# Patient Record
Sex: FEMALE | HISPANIC OR LATINO | URBAN - METROPOLITAN AREA
[De-identification: names, ages, dates, MRNs, and addresses within clinical notes are randomized per-mention and may not be internally consistent; named-entity substitution may affect disease eponyms.]

---

## 2020-08-11 ENCOUNTER — OFFICE VISIT (OUTPATIENT)
Dept: URBAN - METROPOLITAN AREA CLINIC 98 | Facility: CLINIC | Age: 55
End: 2020-08-11
Payer: COMMERCIAL

## 2020-08-11 DIAGNOSIS — R10.32 LLQ ABDOMINAL PAIN: ICD-10-CM

## 2020-08-11 DIAGNOSIS — R10.13 EPIGASTRIC PAIN: ICD-10-CM

## 2020-08-11 PROCEDURE — 99243 OFF/OP CNSLTJ NEW/EST LOW 30: CPT | Performed by: INTERNAL MEDICINE

## 2020-08-11 PROCEDURE — 99203 OFFICE O/P NEW LOW 30 MIN: CPT | Performed by: INTERNAL MEDICINE

## 2020-08-11 RX ORDER — PANTOPRAZOLE SODIUM 40 MG/1
1 TABLET TABLET, DELAYED RELEASE ORAL ONCE A DAY
Qty: 30 | Refills: 2 | OUTPATIENT
Start: 2020-08-11

## 2020-08-11 RX ORDER — PANTOPRAZOLE SODIUM 40 MG/1
1 TABLET TABLET, DELAYED RELEASE ORAL ONCE A DAY
Qty: 30 | Refills: 3 | OUTPATIENT
Start: 2020-08-11

## 2020-08-11 RX ORDER — SODIUM, POTASSIUM,MAG SULFATES 17.5-3.13G
354ML SOLUTION, RECONSTITUTED, ORAL ORAL
Qty: 354 MILLILITER | Refills: 0 | OUTPATIENT
Start: 2020-08-11 | End: 2020-08-12

## 2020-08-11 NOTE — HPI-TODAY'S VISIT:
56 yo pt w/ Hx prolongued viral URI 3/20, complicated by bronchitis, Rx'd w/ steroids, and abx's w/ gradual resolution.  Today, and for the paste couple of mos, she has been c/o waterbrash, p-prandial epigastric discomfort at times, w/ some lower abdominal distention and bloating w/ p-prandial bm's w/o bleeding. She was Rx'd for Hp-gastritis, and above sxs have not resolved. She admits to starining at stools and no melenic stools. Denies fever or chills and no cardiorespiratory sxs.

## 2020-08-24 ENCOUNTER — OFFICE VISIT (OUTPATIENT)
Dept: URBAN - METROPOLITAN AREA SURGERY CENTER 18 | Facility: SURGERY CENTER | Age: 55
End: 2020-08-24
Payer: COMMERCIAL

## 2020-08-24 DIAGNOSIS — K63.5 BENIGN COLON POLYP: ICD-10-CM

## 2020-08-24 DIAGNOSIS — R10.84 ABDOMINAL CRAMPING, GENERALIZED: ICD-10-CM

## 2020-08-24 PROCEDURE — 45380 COLONOSCOPY AND BIOPSY: CPT | Performed by: INTERNAL MEDICINE

## 2020-08-24 PROCEDURE — G9937 DIG OR SURV COLSCO: HCPCS | Performed by: INTERNAL MEDICINE

## 2020-08-24 PROCEDURE — G8907 PT DOC NO EVENTS ON DISCHARG: HCPCS | Performed by: INTERNAL MEDICINE

## 2020-08-24 RX ORDER — PANTOPRAZOLE SODIUM 40 MG/1
1 TABLET TABLET, DELAYED RELEASE ORAL ONCE A DAY
Qty: 30 | Refills: 2 | Status: ACTIVE | COMMUNITY
Start: 2020-08-11

## 2020-08-24 RX ORDER — PANTOPRAZOLE SODIUM 40 MG/1
1 TABLET TABLET, DELAYED RELEASE ORAL ONCE A DAY
Qty: 30 | Refills: 3 | Status: ACTIVE | COMMUNITY
Start: 2020-08-11

## 2020-08-27 ENCOUNTER — TELEPHONE ENCOUNTER (OUTPATIENT)
Dept: URBAN - METROPOLITAN AREA CLINIC 98 | Facility: CLINIC | Age: 55
End: 2020-08-27

## 2020-08-28 ENCOUNTER — LAB OUTSIDE AN ENCOUNTER (OUTPATIENT)
Dept: URBAN - METROPOLITAN AREA CLINIC 98 | Facility: CLINIC | Age: 55
End: 2020-08-28

## 2020-09-08 ENCOUNTER — LAB OUTSIDE AN ENCOUNTER (OUTPATIENT)
Dept: URBAN - METROPOLITAN AREA CLINIC 98 | Facility: CLINIC | Age: 55
End: 2020-09-08

## 2020-09-10 ENCOUNTER — OFFICE VISIT (OUTPATIENT)
Dept: URBAN - METROPOLITAN AREA SURGERY CENTER 18 | Facility: SURGERY CENTER | Age: 55
End: 2020-09-10

## 2020-09-10 LAB
C-REACTIVE PROTEIN, QUANT: 7
IMMUNOGLOBULIN A, QN, SERUM: 181
IRON BIND.CAP.(TIBC): 287
IRON SATURATION: 25
IRON: 71
SEDIMENTATION RATE-WESTERGREN: 43
T-TRANSGLUTAMINASE (TTG) IGA: <2
UIBC: 216
VITAMIN B12: 634

## 2020-09-16 ENCOUNTER — OFFICE VISIT (OUTPATIENT)
Dept: URBAN - METROPOLITAN AREA CLINIC 98 | Facility: CLINIC | Age: 55
End: 2020-09-16
Payer: COMMERCIAL

## 2020-09-16 DIAGNOSIS — K76.0 NAFLD (NONALCOHOLIC FATTY LIVER DISEASE): ICD-10-CM

## 2020-09-16 DIAGNOSIS — R10.9 ABDOMINAL PAIN OF MULTIPLE SITES: ICD-10-CM

## 2020-09-16 DIAGNOSIS — K76.89 LIVER CYSTS: ICD-10-CM

## 2020-09-16 PROCEDURE — 1036F TOBACCO NON-USER: CPT | Performed by: INTERNAL MEDICINE

## 2020-09-16 PROCEDURE — G8420 CALC BMI NORM PARAMETERS: HCPCS | Performed by: INTERNAL MEDICINE

## 2020-09-16 PROCEDURE — 3017F COLORECTAL CA SCREEN DOC REV: CPT | Performed by: INTERNAL MEDICINE

## 2020-09-16 PROCEDURE — G9903 PT SCRN TBCO ID AS NON USER: HCPCS | Performed by: INTERNAL MEDICINE

## 2020-09-16 PROCEDURE — G8427 DOCREV CUR MEDS BY ELIG CLIN: HCPCS | Performed by: INTERNAL MEDICINE

## 2020-09-16 PROCEDURE — 99214 OFFICE O/P EST MOD 30 MIN: CPT | Performed by: INTERNAL MEDICINE

## 2020-09-16 RX ORDER — PANTOPRAZOLE SODIUM 40 MG/1
1 TABLET TABLET, DELAYED RELEASE ORAL ONCE A DAY
Qty: 30 | Refills: 2 | Status: ACTIVE | COMMUNITY
Start: 2020-08-11

## 2020-09-16 RX ORDER — PANTOPRAZOLE SODIUM 40 MG/1
1 TABLET TABLET, DELAYED RELEASE ORAL ONCE A DAY
Qty: 30 | Refills: 3 | Status: ACTIVE | COMMUNITY
Start: 2020-08-11

## 2020-09-16 NOTE — HPI-TODAY'S VISIT:
56 yo pt for abdominal pain f/u. Doing muc better; less abdominal pain, no INDIGO sxs, weight stable, good appetite on a healthy diet. No LGI sxs. Colonoscopy: normal x for lymphoid aggregate. Labs: normal B 12, CRP, celiac serology  and ESR 43. RUQ-US: fatty liver liver cysts and normal GB. She has no otehr sxs after extensive ROS.

## 2020-09-22 ENCOUNTER — OFFICE VISIT (OUTPATIENT)
Dept: URBAN - METROPOLITAN AREA CLINIC 97 | Facility: CLINIC | Age: 55
End: 2020-09-22

## 2020-10-08 ENCOUNTER — OFFICE VISIT (OUTPATIENT)
Dept: URBAN - METROPOLITAN AREA CLINIC 98 | Facility: CLINIC | Age: 55
End: 2020-10-08

## 2021-03-17 ENCOUNTER — OFFICE VISIT (OUTPATIENT)
Dept: URBAN - METROPOLITAN AREA CLINIC 98 | Facility: CLINIC | Age: 56
End: 2021-03-17
Payer: COMMERCIAL

## 2021-03-17 DIAGNOSIS — R10.13 EPIGASTRIC PAIN: ICD-10-CM

## 2021-03-17 DIAGNOSIS — K76.0 NAFLD (NONALCOHOLIC FATTY LIVER DISEASE): ICD-10-CM

## 2021-03-17 PROCEDURE — 99214 OFFICE O/P EST MOD 30 MIN: CPT | Performed by: INTERNAL MEDICINE

## 2021-03-17 RX ORDER — PANTOPRAZOLE SODIUM 40 MG/1
1 TABLET TABLET, DELAYED RELEASE ORAL ONCE A DAY
Qty: 30 | Refills: 3 | Status: ACTIVE | COMMUNITY
Start: 2020-08-11

## 2021-03-17 RX ORDER — PANTOPRAZOLE SODIUM 40 MG/1
1 TABLET TABLET, DELAYED RELEASE ORAL ONCE A DAY
Qty: 30 | Refills: 2 | Status: ACTIVE | COMMUNITY
Start: 2020-08-11

## 2021-03-17 NOTE — HPI-TODAY'S VISIT:
54 yo pt w/ known and controlled INDIGO sxs w/ diet, NAFLD on diet , now w/ some LBP and R-hip pain w radiation to the R-LE. No alarm sxs. Has lost ~ 12 to 15 lbs w/ diet changes only. Past few days, p-prandial epigastric pain and fullness w/o N/V.  NO anorexia or fatigue. Good appetite and normal bowel pattern. No urologic sxs. Denies cardiorespiratory sxs.  Normal colonoscopy last year.

## 2021-03-18 ENCOUNTER — TELEPHONE ENCOUNTER (OUTPATIENT)
Dept: URBAN - METROPOLITAN AREA CLINIC 98 | Facility: CLINIC | Age: 56
End: 2021-03-18

## 2021-03-18 ENCOUNTER — OFFICE VISIT (OUTPATIENT)
Dept: URBAN - METROPOLITAN AREA SURGERY CENTER 18 | Facility: SURGERY CENTER | Age: 56
End: 2021-03-18
Payer: COMMERCIAL

## 2021-03-18 DIAGNOSIS — K22.8 COLUMNAR-LINED ESOPHAGUS: ICD-10-CM

## 2021-03-18 DIAGNOSIS — K29.60 ADENOPAPILLOMATOSIS GASTRICA: ICD-10-CM

## 2021-03-18 LAB
A/G RATIO: 1.4
ALBUMIN: 4.2
ALKALINE PHOSPHATASE: 94
ALT (SGPT): 18
AST (SGOT): 10
BILIRUBIN, TOTAL: 0.4
BUN/CREATININE RATIO: 20
BUN: 12
CALCIUM: 9.9
CARBON DIOXIDE, TOTAL: 24
CHLORIDE: 106
CREATININE: 0.59
EGFR IF AFRICN AM: 118
EGFR IF NONAFRICN AM: 102
GLOBULIN, TOTAL: 2.9
GLUCOSE: 99
HEMATOCRIT: 42.1
HEMOGLOBIN: 13.8
LIPASE: 26
MCH: 30.1
MCHC: 32.8
MCV: 92
NRBC: (no result)
PLATELETS: 170
POTASSIUM: 4.1
PROTEIN, TOTAL: 7.1
RBC: 4.58
RDW: 13
SEDIMENTATION RATE-WESTERGREN: 11
SODIUM: 143
T4,FREE(DIRECT): 1.43
TSH: 1.76
WBC: 5.9

## 2021-03-18 PROCEDURE — 43239 EGD BIOPSY SINGLE/MULTIPLE: CPT | Performed by: INTERNAL MEDICINE

## 2021-03-18 PROCEDURE — G8907 PT DOC NO EVENTS ON DISCHARG: HCPCS | Performed by: INTERNAL MEDICINE

## 2021-03-18 RX ORDER — PANTOPRAZOLE SODIUM 40 MG/1
1 TABLET TABLET, DELAYED RELEASE ORAL ONCE A DAY
Qty: 30 | Refills: 2 | Status: ACTIVE | COMMUNITY
Start: 2020-08-11

## 2021-03-18 RX ORDER — PANTOPRAZOLE SODIUM 40 MG/1
1 TABLET TABLET, DELAYED RELEASE ORAL ONCE A DAY
Qty: 30 | Refills: 3 | Status: ACTIVE | COMMUNITY
Start: 2020-08-11

## 2021-03-18 RX ORDER — PANTOPRAZOLE SODIUM 40 MG/1
1 TABLET TABLET, DELAYED RELEASE ORAL ONCE A DAY
Qty: 30 | Refills: 3 | OUTPATIENT
Start: 2021-03-23

## 2021-03-19 ENCOUNTER — LAB OUTSIDE AN ENCOUNTER (OUTPATIENT)
Dept: URBAN - METROPOLITAN AREA CLINIC 98 | Facility: CLINIC | Age: 56
End: 2021-03-19

## 2021-03-25 ENCOUNTER — OFFICE VISIT (OUTPATIENT)
Dept: URBAN - METROPOLITAN AREA CLINIC 98 | Facility: CLINIC | Age: 56
End: 2021-03-25
Payer: COMMERCIAL

## 2021-03-25 DIAGNOSIS — K76.89 LIVER CYST: ICD-10-CM

## 2021-03-25 DIAGNOSIS — R14.0 BLOATING SYMPTOM: ICD-10-CM

## 2021-03-25 DIAGNOSIS — K21.9 GERD WITHOUT ESOPHAGITIS: ICD-10-CM

## 2021-03-25 DIAGNOSIS — Z86.010 PERSONAL HISTORY OF COLONIC POLYPS: ICD-10-CM

## 2021-03-25 DIAGNOSIS — K76.0 NAFLD (NONALCOHOLIC FATTY LIVER DISEASE): ICD-10-CM

## 2021-03-25 PROBLEM — 21522001 ABDOMINAL PAIN: Status: ACTIVE | Noted: 2021-03-25

## 2021-03-25 PROCEDURE — 99214 OFFICE O/P EST MOD 30 MIN: CPT | Performed by: INTERNAL MEDICINE

## 2021-03-25 RX ORDER — PANTOPRAZOLE SODIUM 40 MG/1
1 TABLET TABLET, DELAYED RELEASE ORAL ONCE A DAY
Qty: 30 | Refills: 3 | Status: ACTIVE | COMMUNITY
Start: 2020-08-11

## 2021-03-25 RX ORDER — PANTOPRAZOLE SODIUM 40 MG/1
1 TABLET TABLET, DELAYED RELEASE ORAL ONCE A DAY
Qty: 30 | Refills: 2 | Status: ACTIVE | COMMUNITY
Start: 2020-08-11

## 2021-03-25 RX ORDER — PANTOPRAZOLE SODIUM 40 MG/1
1 TABLET TABLET, DELAYED RELEASE ORAL ONCE A DAY
Qty: 30 | Refills: 3 | Status: ACTIVE | COMMUNITY
Start: 2021-03-23

## 2021-03-25 NOTE — HPI-TODAY'S VISIT:
54 yo pt w/ controlled INDIGO sxs and mayra alarm nor extraesophageal sxs and NFLD on diet. Has been feeling better. EGD: NERD and Hp-negative antral gastritis. Colonoscopy 8/20: colon polyps and colonoic spasm. RUQ-US: fatty liver w/o parenchymal lesions and a liver cyst. Labs are all nomal including LFT's. No other complaints. No COVID-19 exposure.

## 2021-03-26 ENCOUNTER — TELEPHONE ENCOUNTER (OUTPATIENT)
Dept: URBAN - METROPOLITAN AREA CLINIC 23 | Facility: CLINIC | Age: 56
End: 2021-03-26

## 2021-08-31 ENCOUNTER — OFFICE VISIT (OUTPATIENT)
Dept: URBAN - METROPOLITAN AREA CLINIC 98 | Facility: CLINIC | Age: 56
End: 2021-08-31
Payer: COMMERCIAL

## 2021-08-31 ENCOUNTER — TELEPHONE ENCOUNTER (OUTPATIENT)
Dept: URBAN - METROPOLITAN AREA CLINIC 98 | Facility: CLINIC | Age: 56
End: 2021-08-31

## 2021-08-31 ENCOUNTER — LAB OUTSIDE AN ENCOUNTER (OUTPATIENT)
Dept: URBAN - METROPOLITAN AREA CLINIC 98 | Facility: CLINIC | Age: 56
End: 2021-08-31

## 2021-08-31 DIAGNOSIS — K76.0 NAFLD (NONALCOHOLIC FATTY LIVER DISEASE): ICD-10-CM

## 2021-08-31 DIAGNOSIS — R14.0 POSTPRANDIAL ABDOMINAL BLOATING: ICD-10-CM

## 2021-08-31 DIAGNOSIS — K76.89 LIVER CYST: ICD-10-CM

## 2021-08-31 DIAGNOSIS — Z86.010 PERSONAL HISTORY OF COLONIC POLYPS: ICD-10-CM

## 2021-08-31 LAB
AG RATIO: 1
ALBUMIN LEVEL: 4.2
ALK PHOS: 91
ALT: 37
ANION GAP: 6
AST: 24
BILIRUBIN TOTAL: 0.4
BUN/CREAT RATIO: 14
BUN: 10
CALCIUM LEVEL: 10
CHLORIDE LEVEL: 108
CO2 LEVEL: 26
CREATININE LEVEL: 0.7
CRP: 0.23
GFR AFRICAN AMERICAN: >60
GFR NON AFRICAN AMERICAN: >60
GLUCOSE LEVEL: 99
OSMO (CALC): 278
PERFORMING LAB: (no result)
PERFORMING LAB: (no result)
POTASSIUM LEVEL: 3.7
PROTEIN TOTAL: 7.7
SODIUM LEVEL: 140

## 2021-08-31 PROCEDURE — 99214 OFFICE O/P EST MOD 30 MIN: CPT | Performed by: INTERNAL MEDICINE

## 2021-08-31 RX ORDER — NITAZOXANIDE 500 MG/1
1 TABLET WITH FOOD TABLET ORAL
Qty: 20 TABLET | Refills: 1 | OUTPATIENT
Start: 2021-08-31 | End: 2021-09-20

## 2021-08-31 RX ORDER — RIFAXIMIN 550 MG/1
1 TABLET TABLET ORAL THREE TIMES A DAY
Qty: 42 TABLET | Refills: 1 | OUTPATIENT
Start: 2021-08-31 | End: 2021-09-28

## 2021-08-31 RX ORDER — PANTOPRAZOLE SODIUM 40 MG/1
1 TABLET TABLET, DELAYED RELEASE ORAL ONCE A DAY
Qty: 30 | Refills: 3 | Status: ACTIVE | COMMUNITY
Start: 2021-03-23

## 2021-08-31 NOTE — HPI-TODAY'S VISIT:
57 yo pt w/ controlled INDIGO sxs and no alarm nor extraesophageal sxs and NAFLD on diet, here for f/u. Lately, has c/ o p-prandial upper abdominal distention after ingestion of all meals, w/o N/V / dysphagia / odynophagia and reports normal bm's w/o melenic stools nor bleeding. On healthy diet. No anorexia or weight loss. Has no cardiorespiratory sxs, x for palpitations. Has had a normal Pulmonary and cardiac evaluation.   No recent labs. Has lost weight w/ diet and exercise. EGD: NERD and Hp-negative antral gastritis. Colonoscopy 8/20: colon polyps and colonoic spasm. RUQ-US: fatty liver w/o parenchymal lesions and a liver cyst.  No other complaints. No COVID-19 exposure.

## 2021-09-02 LAB
CALPROTECTIN FECES: (no result)
PANC ELASTASE INTERP: (no result)
PERFORMING LAB: (no result)
PERFORMING LAB: (no result)

## 2021-09-28 ENCOUNTER — OFFICE VISIT (OUTPATIENT)
Dept: URBAN - METROPOLITAN AREA CLINIC 97 | Facility: CLINIC | Age: 56
End: 2021-09-28
Payer: COMMERCIAL

## 2021-09-28 DIAGNOSIS — K76.89 HEPATIC CYST: ICD-10-CM

## 2021-09-28 PROCEDURE — 76705 ECHO EXAM OF ABDOMEN: CPT | Performed by: INTERNAL MEDICINE

## 2021-10-02 ENCOUNTER — TELEPHONE ENCOUNTER (OUTPATIENT)
Dept: URBAN - METROPOLITAN AREA CLINIC 92 | Facility: CLINIC | Age: 56
End: 2021-10-02

## 2021-10-12 ENCOUNTER — LAB OUTSIDE AN ENCOUNTER (OUTPATIENT)
Dept: URBAN - METROPOLITAN AREA CLINIC 98 | Facility: CLINIC | Age: 56
End: 2021-10-12

## 2021-10-27 ENCOUNTER — OFFICE VISIT (OUTPATIENT)
Dept: URBAN - METROPOLITAN AREA CLINIC 98 | Facility: CLINIC | Age: 56
End: 2021-10-27
Payer: COMMERCIAL

## 2021-10-27 DIAGNOSIS — R35.1 NOCTURIA: ICD-10-CM

## 2021-10-27 DIAGNOSIS — K21.9 GERD WITHOUT ESOPHAGITIS: ICD-10-CM

## 2021-10-27 DIAGNOSIS — R14.0 ABDOMINAL BLOATING: ICD-10-CM

## 2021-10-27 PROCEDURE — 99213 OFFICE O/P EST LOW 20 MIN: CPT | Performed by: INTERNAL MEDICINE

## 2021-10-27 RX ORDER — PANTOPRAZOLE SODIUM 40 MG/1
1 TABLET TABLET, DELAYED RELEASE ORAL ONCE A DAY
Qty: 30 | Refills: 3 | Status: ACTIVE | COMMUNITY
Start: 2021-03-23

## 2021-10-27 NOTE — HPI-TODAY'S VISIT:
55 yo pt w/ controlled INDIGO sxs and no alarm nor extraesophageal sxs and NAFLD on diet, here for f/u. Lately, has still intermittent, but less  p-prandial upper abdominal distention after ingestion of all meals, w/o N/V / dysphagia / odynophagia and reports normal bm's w/o melenic stools nor bleeding. On healthy diet. No anorexia or weight loss. Has no cardiorespiratory sxs, x for palpitations.  Recent MRI : normal x for hepatic cyst. Has had a normal Pulmonary and cardiac evaluation.   No recent labs. Has lost weight w/ diet and exercise. EGD: NERD and Hp-negative antral gastritis. Colonoscopy 8/20: colon polyps and colonoic spasm. RUQ-US: fatty liver w/o parenchymal lesions and a liver cyst.  No other complaints. No COVID-19 exposure. She has been experiencing urinary frequency and nocturia. No hematuria , dysuria nor fever or chills.

## 2022-09-19 ENCOUNTER — TELEPHONE ENCOUNTER (OUTPATIENT)
Dept: URBAN - METROPOLITAN AREA CLINIC 98 | Facility: CLINIC | Age: 57
End: 2022-09-19

## 2022-10-26 ENCOUNTER — OFFICE VISIT (OUTPATIENT)
Dept: URBAN - METROPOLITAN AREA CLINIC 98 | Facility: CLINIC | Age: 57
End: 2022-10-26
Payer: COMMERCIAL

## 2022-10-26 ENCOUNTER — TELEPHONE ENCOUNTER (OUTPATIENT)
Dept: URBAN - METROPOLITAN AREA CLINIC 98 | Facility: CLINIC | Age: 57
End: 2022-10-26

## 2022-10-26 ENCOUNTER — LAB OUTSIDE AN ENCOUNTER (OUTPATIENT)
Dept: URBAN - METROPOLITAN AREA CLINIC 98 | Facility: CLINIC | Age: 57
End: 2022-10-26

## 2022-10-26 VITALS
DIASTOLIC BLOOD PRESSURE: 60 MMHG | WEIGHT: 144.4 LBS | BODY MASS INDEX: 30.31 KG/M2 | SYSTOLIC BLOOD PRESSURE: 114 MMHG | TEMPERATURE: 97.1 F | HEART RATE: 69 BPM | HEIGHT: 58 IN

## 2022-10-26 DIAGNOSIS — R10.30 LOWER ABDOMINAL PAIN: ICD-10-CM

## 2022-10-26 DIAGNOSIS — K76.0 NAFLD (NONALCOHOLIC FATTY LIVER DISEASE): ICD-10-CM

## 2022-10-26 DIAGNOSIS — K76.89 LIVER CYST: ICD-10-CM

## 2022-10-26 DIAGNOSIS — K21.9 GERD WITHOUT ESOPHAGITIS: ICD-10-CM

## 2022-10-26 DIAGNOSIS — Z86.010 PERSONAL HISTORY OF COLONIC POLYPS: ICD-10-CM

## 2022-10-26 DIAGNOSIS — E66.9 OBESITY (BMI 30.0-34.9): ICD-10-CM

## 2022-10-26 LAB
ABSOLUTE BASOPHIL COUNT: 0.03
ABSOLUTE EOSINOPHIL COUNT: 0.07
ABSOLUTE IMMATURE GRANULOCYTE  COUNT: 0.01
ABSOLUTE LYMPHOCYTE COUNT: 2.53
ABSOLUTE MONOCYTE COUNT: 0.27
ABSOLUTE NEUTROPHIL COUNT (ANC): 2.58
ABSOLUTE NRBC  COUNT: 0
AG RATIO: 1
ALBUMIN LEVEL: 4.3
ALK PHOS: 94
ALT: 28
ANION GAP: 7
AST: 22
BASOPHIL AUTO: 0
BILIRUBIN TOTAL: 0.5
BUN/CREAT RATIO: 21
BUN: 14
CALCIUM LEVEL: 9.7
CHLORIDE LEVEL: 107
CO2 LEVEL: 24
CREATININE LEVEL: 0.7
EOS AUTO: 1
GFR 2021: >60
GLUCOSE LEVEL: 74
HCT: 42.9
HGB: 13.6
IMMATURE GRANULOCYTES AUTO: 0.2
LIPASE LEVEL: 27
LYMPH AUTO: 46
MCH: 29.8
MCHC: 31.7
MCV: 93.9
MONO AUTO: 5
MPV: 12
NEUTRO AUTO: 47
NRBC AUTO: 0
OSMO (CALC): 275
PERFORMING LAB: (no result)
PLATELETS: 172
POTASSIUM LEVEL: 3.9
PROTEIN TOTAL: 7.6
RBC: 4.57
RDW: 12.1
SODIUM LEVEL: 138
WBC: 5.5

## 2022-10-26 PROCEDURE — 99214 OFFICE O/P EST MOD 30 MIN: CPT | Performed by: INTERNAL MEDICINE

## 2022-10-26 RX ORDER — PANTOPRAZOLE SODIUM 40 MG/1
1 TABLET TABLET, DELAYED RELEASE ORAL ONCE A DAY
Qty: 30 | Refills: 3 | Status: ACTIVE | COMMUNITY
Start: 2021-03-23

## 2022-10-26 NOTE — PHYSICAL EXAM GASTROINTESTINAL
Abdomen ,  lower abdomen distended, firm to palpation and  diffusely tender in lower abdomen w guarding , no masses palpable , normal bowel sounds , Liver and Spleen , no hepatomegaly present , no hepatosplenomegaly , liver nontender , spleen not palpable

## 2022-10-26 NOTE — HPI-TODAY'S VISIT:
56 yo pt w/ controlled INDIGO sxs and no alarm nor extraesophageal sxs,  NAFLD wo fibrosis  on diet, here for f/u.  She's s/p GYN surgery for  vaginal prolapse - cystocele w lysis of adhesions on 2/22 wo complications. Lately, has noted  lower abdominal distention and firmness, w pressure and mild abdominal pain.  Normal bm's and no urologic sxs. Has been sen by Surgery: normal evaluation, no imaging. She denies fever, chills, night sweats and no melenic stools or hematochezia.  On healthy diet. No anorexia or weight loss. Has no cardiorespiratory sxs, x for occasional palpitations.  Preop  MRI : normal x for hepatic cyst. Has had a normal Pulmonary and cardiac evaluation.   No recent labs. Has lost weight since her surgery. EGD: NERD and Hp-negative antral gastritis. Colonoscopy 8/20: colon polyps and colonic spasm. RUQ-US: fatty liver w/o parenchymal lesions and a liver cyst.  No other complaints. No COVID-19 exposure. She has been experiencing urinary frequency and nocturia. No hematuria , dysuria nor fever or chills.

## 2022-10-31 LAB
PERFORMING LAB: (no result)
SOURCE: (no result)

## 2022-11-16 ENCOUNTER — TELEPHONE ENCOUNTER (OUTPATIENT)
Dept: URBAN - METROPOLITAN AREA CLINIC 98 | Facility: CLINIC | Age: 57
End: 2022-11-16

## 2022-11-30 ENCOUNTER — WEB ENCOUNTER (OUTPATIENT)
Dept: URBAN - METROPOLITAN AREA CLINIC 98 | Facility: CLINIC | Age: 57
End: 2022-11-30

## 2022-11-30 ENCOUNTER — OFFICE VISIT (OUTPATIENT)
Dept: URBAN - METROPOLITAN AREA CLINIC 98 | Facility: CLINIC | Age: 57
End: 2022-11-30
Payer: COMMERCIAL

## 2022-11-30 VITALS
HEIGHT: 58 IN | BODY MASS INDEX: 29.56 KG/M2 | TEMPERATURE: 97.4 F | HEART RATE: 66 BPM | WEIGHT: 140.8 LBS | DIASTOLIC BLOOD PRESSURE: 74 MMHG | SYSTOLIC BLOOD PRESSURE: 125 MMHG

## 2022-11-30 DIAGNOSIS — R10.30 LOWER ABDOMINAL PAIN: ICD-10-CM

## 2022-11-30 DIAGNOSIS — K76.0 NAFLD (NONALCOHOLIC FATTY LIVER DISEASE): ICD-10-CM

## 2022-11-30 DIAGNOSIS — E66.9 OBESITY (BMI 30.0-34.9): ICD-10-CM

## 2022-11-30 DIAGNOSIS — K76.89 LIVER CYST: ICD-10-CM

## 2022-11-30 DIAGNOSIS — K21.9 GERD WITHOUT ESOPHAGITIS: ICD-10-CM

## 2022-11-30 PROCEDURE — 99214 OFFICE O/P EST MOD 30 MIN: CPT | Performed by: INTERNAL MEDICINE

## 2022-11-30 RX ORDER — PANTOPRAZOLE SODIUM 40 MG/1
1 TABLET TABLET, DELAYED RELEASE ORAL ONCE A DAY
Qty: 30 | Refills: 3 | Status: ACTIVE | COMMUNITY
Start: 2021-03-23

## 2022-11-30 NOTE — HPI-TODAY'S VISIT:
56 yo pt w/ controlled INDIGO sxs and no alarm nor extraesophageal sxs,  She reports intermittent lower abdominal discomfort, lasting few hours, ? triggers, unrelated to eating ot type of food she eats, wo change in bowel consistency nor frequency, and  wo bleeding nor melenic stools or urologic sxs.  Weight stable, good appetite. Recent A + P CT: liver cysts, otherwise normal. Known NAFLD wo fibrosis  on MAYNARD diet. She's s/p GYN surgery for  vaginal prolapse - cystocele w lysis of adhesions on 2/22 wo complications.  Has been sen by Surgery: normal evaluation, no imaging. She denies fever, chills, night sweats and no melenic stools or hematochezia.  Has no cardiorespiratory sxs, x for occasional palpitations.  Preop  MRI : normal x for hepatic cyst. Has had a normal Pulmonary and cardiac evaluation.   No recent labs. Has lost weight since her surgery. EGD: NERD and Hp-negative antral gastritis. Colonoscopy 8/20: colon polyps and colonic spasm. RUQ-US: fatty liver w/o parenchymal lesions and a liver cyst.  No other complaints. No COVID-19 exposure. No other complaints.

## 2022-12-02 PROBLEM — 85057007: Status: ACTIVE | Noted: 2021-03-30

## 2022-12-02 PROBLEM — 443371000124107: Status: ACTIVE | Noted: 2022-10-29

## 2022-12-02 PROBLEM — 197315008: Status: ACTIVE | Noted: 2021-03-17

## 2022-12-02 PROBLEM — 428283002: Status: ACTIVE | Noted: 2021-03-30

## 2022-12-02 PROBLEM — 266435005: Status: ACTIVE | Noted: 2021-03-30

## 2023-07-26 ENCOUNTER — OFFICE VISIT (OUTPATIENT)
Dept: URBAN - METROPOLITAN AREA CLINIC 98 | Facility: CLINIC | Age: 58
End: 2023-07-26
Payer: COMMERCIAL

## 2023-07-26 ENCOUNTER — TELEPHONE ENCOUNTER (OUTPATIENT)
Dept: URBAN - METROPOLITAN AREA CLINIC 98 | Facility: CLINIC | Age: 58
End: 2023-07-26

## 2023-07-26 VITALS
HEIGHT: 58 IN | WEIGHT: 148.8 LBS | TEMPERATURE: 97 F | SYSTOLIC BLOOD PRESSURE: 121 MMHG | DIASTOLIC BLOOD PRESSURE: 73 MMHG | BODY MASS INDEX: 31.23 KG/M2 | HEART RATE: 71 BPM

## 2023-07-26 DIAGNOSIS — K76.0 NAFLD (NONALCOHOLIC FATTY LIVER DISEASE): ICD-10-CM

## 2023-07-26 DIAGNOSIS — K21.9 GERD WITHOUT ESOPHAGITIS: ICD-10-CM

## 2023-07-26 DIAGNOSIS — E66.9 OBESITY (BMI 30.0-34.9): ICD-10-CM

## 2023-07-26 DIAGNOSIS — K76.89 LIVER CYST: ICD-10-CM

## 2023-07-26 PROCEDURE — 99214 OFFICE O/P EST MOD 30 MIN: CPT | Performed by: INTERNAL MEDICINE

## 2023-07-26 RX ORDER — PANTOPRAZOLE SODIUM 40 MG/1
1 TABLET TABLET, DELAYED RELEASE ORAL ONCE A DAY
Qty: 30 | Refills: 3 | Status: ON HOLD | COMMUNITY
Start: 2021-03-23

## 2023-07-26 NOTE — HPI-TODAY'S VISIT:
58 yo pt w/ controlled INDIGO sxs and no alarm nor extraesophageal sxs,  She reports intermittent p-prandial dyspepsia wo INDIGO sxs. No abdominal pain nor urologic sxs. Had a UTI 6/23, resolved w Cephalexin.  Weight stable, good appetite. Recent A + P CT: liver cysts, otherwise normal. Known NAFLD wo fibrosis  on MAYNARD diet. She denies fever, chills, night sweats and no melenic stools or hematochezia.  Has no cardiorespiratory sxs, x for occasional palpitations.  MRI : normal x for hepatic cyst. Has had a normal Pulmonary and cardiac evaluation.   No recent labs. Has lost weight since her GYN surgery. EGD: NERD and Hp-negative antral gastritis. Colonoscopy 8/20: colon polyps and colonic spasm. RUQ-US: fatty liver w/o parenchymal lesions and a liver cyst.  No other complaints. No COVID-19 exposure. No other complaints.

## 2023-10-26 ENCOUNTER — OFFICE VISIT (OUTPATIENT)
Dept: URBAN - METROPOLITAN AREA CLINIC 98 | Facility: CLINIC | Age: 58
End: 2023-10-26
Payer: COMMERCIAL

## 2023-10-26 ENCOUNTER — LAB OUTSIDE AN ENCOUNTER (OUTPATIENT)
Dept: URBAN - METROPOLITAN AREA CLINIC 98 | Facility: CLINIC | Age: 58
End: 2023-10-26

## 2023-10-26 ENCOUNTER — DASHBOARD ENCOUNTERS (OUTPATIENT)
Age: 58
End: 2023-10-26

## 2023-10-26 VITALS
SYSTOLIC BLOOD PRESSURE: 108 MMHG | BODY MASS INDEX: 31.4 KG/M2 | DIASTOLIC BLOOD PRESSURE: 69 MMHG | HEIGHT: 58 IN | TEMPERATURE: 97.8 F | HEART RATE: 71 BPM | WEIGHT: 149.6 LBS

## 2023-10-26 DIAGNOSIS — K76.89 LIVER CYST: ICD-10-CM

## 2023-10-26 DIAGNOSIS — K21.9 GERD WITHOUT ESOPHAGITIS: ICD-10-CM

## 2023-10-26 DIAGNOSIS — E66.9 OBESITY (BMI 30.0-34.9): ICD-10-CM

## 2023-10-26 DIAGNOSIS — K76.0 NAFLD (NONALCOHOLIC FATTY LIVER DISEASE): ICD-10-CM

## 2023-10-26 DIAGNOSIS — K64.4 SKIN TAG OF PERIANAL REGION: ICD-10-CM

## 2023-10-26 PROCEDURE — 99214 OFFICE O/P EST MOD 30 MIN: CPT | Performed by: INTERNAL MEDICINE

## 2023-10-26 RX ORDER — PANTOPRAZOLE SODIUM 40 MG/1
1 TABLET TABLET, DELAYED RELEASE ORAL ONCE A DAY
Qty: 30 | Refills: 3 | Status: ON HOLD | COMMUNITY
Start: 2021-03-23

## 2023-10-26 RX ORDER — FAMOTIDINE 40 MG/1
1 TABLET AT BEDTIME TABLET, FILM COATED ORAL ONCE A DAY
Qty: 30 | Refills: 3 | OUTPATIENT
Start: 2023-10-26

## 2023-10-26 NOTE — HPI-TODAY'S VISIT:
59 yo pt w/ controlled INDIGO sxs and no alarm nor extraesophageal sxs,  She reports intermittent p-prandial dyspepsia, w epigastric distention wo INDIGO sxs. No abdominal pain nor urologic sxs, although she just had an UTI s/p Nitrofurantoin, and resolved. Weight stable, good appetite. Recent A + P CT: liver cysts, otherwise normal. Known NAFLD wo fibrosis  on MAYNARD diet. She denies fever, chills, night sweats and no melenic stools or hematochezia. Alternating soft and formed stools.   Has no cardiorespiratory sxs, x for occasional palpitations.  MRI : normal x for hepatic cyst. Has had a normal Pulmonary and cardiac evaluation. Labs 2 weeks ago, pending. Has lost weight since her GYN surgery. EGD: NERD and Hp-negative antral gastritis. Colonoscopy 8/20: colon polyps and colonic spasm. RUQ-US: fatty liver w/o parenchymal lesions and a liver cyst.  No other complaints. No other complaints.

## 2023-10-26 NOTE — PHYSICAL EXAM GASTROINTESTINAL
Abdomen , soft, nontender, nondistended , no guarding or rigidity , no masses palpable , normal bowel sounds , Liver and Spleen, no hepatosplenomegaly , liver nontender. SPARKLE: large perianal skin tag, no stools in rectal  vault.

## 2023-10-28 LAB
AG RATIO: 1
ALBUMIN LEVEL: 4.3
ALK PHOS: 93
ALT: 20
ANION GAP: 8
AST: 15
BILIRUBIN TOTAL: 0.4
BUN/CREAT RATIO: 13
BUN: 9
CALCIUM LEVEL: 9.8
CHLORIDE LEVEL: 106
CO2 LEVEL: 26
CREATININE LEVEL: 0.7
GFR 2021: >60
GLUCOSE LEVEL: 89
OSMO (CALC): 278
PERFORMING LAB: (no result)
POTASSIUM LEVEL: 3.8
PROTEIN TOTAL: 7.7
SODIUM LEVEL: 140

## 2023-11-02 ENCOUNTER — TELEPHONE ENCOUNTER (OUTPATIENT)
Dept: URBAN - METROPOLITAN AREA CLINIC 98 | Facility: CLINIC | Age: 58
End: 2023-11-02

## 2023-11-02 RX ORDER — SODIUM, POTASSIUM,MAG SULFATES 17.5-3.13G
354ML SOLUTION, RECONSTITUTED, ORAL ORAL
Qty: 345 MILLILITER | Refills: 0 | OUTPATIENT
Start: 2023-11-04 | End: 2023-11-05

## 2023-11-03 LAB
PERFORMING LAB: (no result)
SOURCE: (no result)

## 2023-11-04 ENCOUNTER — LAB OUTSIDE AN ENCOUNTER (OUTPATIENT)
Dept: URBAN - METROPOLITAN AREA CLINIC 98 | Facility: CLINIC | Age: 58
End: 2023-11-04

## 2023-11-06 ENCOUNTER — TELEPHONE ENCOUNTER (OUTPATIENT)
Dept: URBAN - METROPOLITAN AREA CLINIC 50 | Facility: CLINIC | Age: 58
End: 2023-11-06

## 2023-12-20 ENCOUNTER — TELEPHONE ENCOUNTER (OUTPATIENT)
Dept: URBAN - METROPOLITAN AREA CLINIC 98 | Facility: CLINIC | Age: 58
End: 2023-12-20

## 2023-12-21 ENCOUNTER — CLAIMS CREATED FROM THE CLAIM WINDOW (OUTPATIENT)
Dept: URBAN - METROPOLITAN AREA SURGERY CENTER 18 | Facility: SURGERY CENTER | Age: 58
End: 2023-12-21
Payer: COMMERCIAL

## 2023-12-21 ENCOUNTER — OFFICE VISIT (OUTPATIENT)
Dept: URBAN - METROPOLITAN AREA SURGERY CENTER 18 | Facility: SURGERY CENTER | Age: 58
End: 2023-12-21

## 2023-12-21 ENCOUNTER — CLAIMS CREATED FROM THE CLAIM WINDOW (OUTPATIENT)
Dept: URBAN - METROPOLITAN AREA CLINIC 4 | Facility: CLINIC | Age: 58
End: 2023-12-21
Payer: COMMERCIAL

## 2023-12-21 DIAGNOSIS — Z12.11 COLON CANCER SCREENING (HIGH RISK): ICD-10-CM

## 2023-12-21 DIAGNOSIS — K58.9 SPASM OF COLON: ICD-10-CM

## 2023-12-21 DIAGNOSIS — Z12.11 COLON CANCER SCREENING: ICD-10-CM

## 2023-12-21 DIAGNOSIS — D12.3 ADENOMA OF TRANSVERSE COLON: ICD-10-CM

## 2023-12-21 DIAGNOSIS — D12.3 BENIGN NEOPLASM OF TRANSVERSE COLON: ICD-10-CM

## 2023-12-21 DIAGNOSIS — K64.8 HEMORRHOIDS, INTERNAL. NON-BLEEDING: ICD-10-CM

## 2023-12-21 DIAGNOSIS — D12.3 ADENOMATOUS POLYP OF TRANSVERSE COLON: ICD-10-CM

## 2023-12-21 PROCEDURE — 45380 COLONOSCOPY AND BIOPSY: CPT | Performed by: INTERNAL MEDICINE

## 2023-12-21 PROCEDURE — 88305 TISSUE EXAM BY PATHOLOGIST: CPT | Performed by: PATHOLOGY

## 2023-12-21 PROCEDURE — G8907 PT DOC NO EVENTS ON DISCHARG: HCPCS | Performed by: INTERNAL MEDICINE

## 2023-12-21 PROCEDURE — 00811 ANES LWR INTST NDSC NOS: CPT | Performed by: ANESTHESIOLOGY

## 2023-12-22 ENCOUNTER — TELEPHONE ENCOUNTER (OUTPATIENT)
Dept: URBAN - METROPOLITAN AREA CLINIC 63 | Facility: CLINIC | Age: 58
End: 2023-12-22

## 2024-08-28 ENCOUNTER — LAB OUTSIDE AN ENCOUNTER (OUTPATIENT)
Dept: URBAN - METROPOLITAN AREA CLINIC 98 | Facility: CLINIC | Age: 59
End: 2024-08-28

## 2024-08-28 ENCOUNTER — OFFICE VISIT (OUTPATIENT)
Dept: URBAN - METROPOLITAN AREA CLINIC 98 | Facility: CLINIC | Age: 59
End: 2024-08-28

## 2024-08-28 VITALS — HEIGHT: 58 IN

## 2024-08-28 RX ORDER — FAMOTIDINE 40 MG/1
1 TABLET TABLET, FILM COATED ORAL ONCE A DAY
Qty: 90 TABLET | Refills: 1 | OUTPATIENT
Start: 2024-08-28

## 2024-08-28 RX ORDER — FAMOTIDINE 40 MG/1
1 TABLET AT BEDTIME TABLET, FILM COATED ORAL ONCE A DAY
Qty: 30 | Refills: 3 | Status: ON HOLD | COMMUNITY
Start: 2023-10-26

## 2024-08-28 RX ORDER — BENZONATATE 100 MG/1
1 CAPSULE AS NEEDED CAPSULE ORAL THREE TIMES A DAY
Status: ACTIVE | COMMUNITY
Start: 2024-08-28

## 2024-08-28 RX ORDER — PREDNISONE 10 MG/1
1 TABLET TABLET ORAL ONCE A DAY
Status: ACTIVE | COMMUNITY
Start: 2024-08-28

## 2024-08-28 RX ORDER — LANSOPRAZOLE 15 MG/1
1 CAPSULE BEFORE A MEAL CAPSULE, DELAYED RELEASE ORAL ONCE A DAY
Qty: 30 | Refills: 1 | OUTPATIENT
Start: 2024-08-28

## 2024-08-28 RX ORDER — SUCRALFATE 1 G/1
1 TABLET ON AN EMPTY STOMACH TABLET ORAL TWICE A DAY
Qty: 60 | Refills: 0 | OUTPATIENT
Start: 2024-08-28 | End: 2024-09-27

## 2024-08-28 RX ORDER — PANTOPRAZOLE SODIUM 40 MG/1
1 TABLET TABLET, DELAYED RELEASE ORAL ONCE A DAY
Qty: 30 | Refills: 3 | Status: ON HOLD | COMMUNITY
Start: 2021-03-23

## 2024-08-28 RX ORDER — ESTRADIOL 0.1 MG/G
APPLY 1 GRAM INTO VAGINA TWICE A WEEK CREAM VAGINAL
Qty: 42.5 GRAM | Refills: 0 | Status: ACTIVE | COMMUNITY

## 2024-08-28 NOTE — HPI-TODAY'S VISIT:
10/26/23- Dr. Araiza 57 yo pt w/ controlled INDIGO sxs and no alarm nor extraesophageal sxs,  She reports intermittent p-prandial dyspepsia, w epigastric distention wo INDIGO sxs. No abdominal pain nor urologic sxs, although she just had an UTI s/p Nitrofurantoin, and resolved. Weight stable, good appetite. Recent A + P CT: liver cysts, otherwise normal. Known NAFLD wo fibrosis  on MAYNARD diet. She denies fever, chills, night sweats and no melenic stools or hematochezia. Alternating soft and formed stools.   Has no cardiorespiratory sxs, x for occasional palpitations.  MRI : normal x for hepatic cyst. Has had a normal Pulmonary and cardiac evaluation. Labs 2 weeks ago, pending. Has lost weight since her GYN surgery. EGD: NERD and Hp-negative antral gastritis. Colonoscopy 8/20: colon polyps and colonic spasm. RUQ-US: fatty liver w/o parenchymal lesions and a liver cyst.  No other complaints. No other complaints.  8/28/24- Adela Grant NP - 60 yo female here with complaints of cough and  - ER [facility] 8/26/24- dx Acute bronchitis- instructed to f/u with PCP - No PCP - Has not started Proair inhaler 2 puff every 6 hours, prednisone, for 5 days or benzonate PRN (cough) -  Jimmie Arrington #[id number] - Colonoscopy with Dr. Araiza 12/21/2023- 4 mm transverse polyp, spasm sigmoid colon, and small non-bleeding internal hemorrhoids. Bx. transverse polyp- TA. Recommend surveillance every 5 years - EGD with Dr. Araiza 3/18/2021- no gross lesions in esophagus, mild inflammation with erosions in gastric antrum. and examined portion of duodenum normal. Bx. duodenum- no celiac disease, dysplasia or malignancy. Bx. lower third of esophagus- chronic inflammation, no Medina's. Bx. gastric antrum- chronic gastritis, no h.pylori, dysplasia or malignancy - Still not feeling well with cough - Here

## 2024-08-29 LAB
AG RATIO: 2
ALBUMIN LEVEL: 4.3
ALK PHOS: 78
ALT: 12
ANION GAP: 4
AST: 21
BILIRUBIN TOTAL: 0.4
BUN/CREAT RATIO: 22
BUN: 15
CALCIUM LEVEL: 9.9
CHLORIDE LEVEL: 110
CO2 LEVEL: 28
CREATININE LEVEL: 0.7
GFR 2021: >60
GLUCOSE LEVEL: 81
OSMO (CALC): 283
PERFORMING LAB: (no result)
POTASSIUM LEVEL: 3.8
PROTEIN TOTAL: 7.1
SODIUM LEVEL: 142

## 2024-09-26 ENCOUNTER — TELEPHONE ENCOUNTER (OUTPATIENT)
Dept: URBAN - METROPOLITAN AREA CLINIC 98 | Facility: CLINIC | Age: 59
End: 2024-09-26